# Patient Record
Sex: FEMALE | Race: ASIAN | Employment: UNEMPLOYED | URBAN - METROPOLITAN AREA
[De-identification: names, ages, dates, MRNs, and addresses within clinical notes are randomized per-mention and may not be internally consistent; named-entity substitution may affect disease eponyms.]

---

## 2021-12-06 ENCOUNTER — HOSPITAL ENCOUNTER (OUTPATIENT)
Age: 66
Setting detail: OUTPATIENT SURGERY
Discharge: HOME | End: 2021-12-06
Attending: INTERNAL MEDICINE | Admitting: INTERNAL MEDICINE
Payer: MEDICARE

## 2021-12-06 VITALS
RESPIRATION RATE: 16 BRPM | TEMPERATURE: 97.1 F | SYSTOLIC BLOOD PRESSURE: 113 MMHG | HEART RATE: 60 BPM | BODY MASS INDEX: 28.61 KG/M2 | DIASTOLIC BLOOD PRESSURE: 66 MMHG | OXYGEN SATURATION: 100 % | HEIGHT: 60 IN | WEIGHT: 145.7 LBS

## 2021-12-06 PROCEDURE — 99153 MOD SED SAME PHYS/QHP EA: CPT | Performed by: INTERNAL MEDICINE

## 2021-12-06 PROCEDURE — 77030039961 HC KT CUST COLON BSC -D: Performed by: INTERNAL MEDICINE

## 2021-12-06 PROCEDURE — 77030013991 HC SNR POLYP ENDOSC BSC -A: Performed by: INTERNAL MEDICINE

## 2021-12-06 PROCEDURE — 76040000008: Performed by: INTERNAL MEDICINE

## 2021-12-06 PROCEDURE — 2709999900 HC NON-CHARGEABLE SUPPLY: Performed by: INTERNAL MEDICINE

## 2021-12-06 PROCEDURE — 74011250636 HC RX REV CODE- 250/636: Performed by: INTERNAL MEDICINE

## 2021-12-06 PROCEDURE — 77030003657 HC NDL SCLER BSC -B: Performed by: INTERNAL MEDICINE

## 2021-12-06 PROCEDURE — G0500 MOD SEDAT ENDO SERVICE >5YRS: HCPCS | Performed by: INTERNAL MEDICINE

## 2021-12-06 PROCEDURE — 77030040361 HC SLV COMPR DVT MDII -B: Performed by: INTERNAL MEDICINE

## 2021-12-06 PROCEDURE — 88305 TISSUE EXAM BY PATHOLOGIST: CPT

## 2021-12-06 RX ORDER — LANOLIN ALCOHOL/MO/W.PET/CERES
500 CREAM (GRAM) TOPICAL DAILY
COMMUNITY

## 2021-12-06 RX ORDER — CHOLECALCIFEROL (VITAMIN D3) 50 MCG
1 CAPSULE ORAL DAILY
COMMUNITY

## 2021-12-06 RX ORDER — FLUMAZENIL 0.1 MG/ML
0.2 INJECTION INTRAVENOUS
Status: DISCONTINUED | OUTPATIENT
Start: 2021-12-06 | End: 2021-12-06 | Stop reason: HOSPADM

## 2021-12-06 RX ORDER — GLUCOSAMINE SULFATE 1500 MG
1000 POWDER IN PACKET (EA) ORAL DAILY
COMMUNITY

## 2021-12-06 RX ORDER — DEXTROMETHORPHAN/PSEUDOEPHED 2.5-7.5/.8
1.2 DROPS ORAL
Status: CANCELLED | OUTPATIENT
Start: 2021-12-06

## 2021-12-06 RX ORDER — MENTHOL
1000 GEL (GRAM) TOPICAL DAILY
COMMUNITY

## 2021-12-06 RX ORDER — SODIUM CHLORIDE 0.9 % (FLUSH) 0.9 %
5-40 SYRINGE (ML) INJECTION AS NEEDED
Status: CANCELLED | OUTPATIENT
Start: 2021-12-06

## 2021-12-06 RX ORDER — SODIUM CHLORIDE 9 MG/ML
1000 INJECTION, SOLUTION INTRAVENOUS CONTINUOUS
Status: DISCONTINUED | OUTPATIENT
Start: 2021-12-06 | End: 2021-12-06 | Stop reason: HOSPADM

## 2021-12-06 RX ORDER — FENTANYL CITRATE 50 UG/ML
100 INJECTION, SOLUTION INTRAMUSCULAR; INTRAVENOUS
Status: DISCONTINUED | OUTPATIENT
Start: 2021-12-06 | End: 2021-12-06 | Stop reason: HOSPADM

## 2021-12-06 RX ORDER — NALOXONE HYDROCHLORIDE 0.4 MG/ML
0.4 INJECTION, SOLUTION INTRAMUSCULAR; INTRAVENOUS; SUBCUTANEOUS
Status: DISCONTINUED | OUTPATIENT
Start: 2021-12-06 | End: 2021-12-06 | Stop reason: HOSPADM

## 2021-12-06 RX ORDER — ATROPINE SULFATE 0.1 MG/ML
0.5 INJECTION INTRAVENOUS
Status: CANCELLED | OUTPATIENT
Start: 2021-12-06 | End: 2021-12-07

## 2021-12-06 RX ORDER — DIPHENHYDRAMINE HYDROCHLORIDE 50 MG/ML
50 INJECTION, SOLUTION INTRAMUSCULAR; INTRAVENOUS ONCE
Status: CANCELLED | OUTPATIENT
Start: 2021-12-06 | End: 2021-12-06

## 2021-12-06 RX ORDER — MIDAZOLAM HYDROCHLORIDE 1 MG/ML
.25-5 INJECTION, SOLUTION INTRAMUSCULAR; INTRAVENOUS
Status: DISCONTINUED | OUTPATIENT
Start: 2021-12-06 | End: 2021-12-06 | Stop reason: HOSPADM

## 2021-12-06 RX ORDER — PYRIDOXINE HCL (VITAMIN B6) 100 MG
100 TABLET ORAL DAILY
COMMUNITY

## 2021-12-06 RX ORDER — EPINEPHRINE 0.1 MG/ML
1 INJECTION INTRACARDIAC; INTRAVENOUS
Status: CANCELLED | OUTPATIENT
Start: 2021-12-06 | End: 2021-12-07

## 2021-12-06 RX ORDER — SODIUM CHLORIDE 0.9 % (FLUSH) 0.9 %
5-40 SYRINGE (ML) INJECTION EVERY 8 HOURS
Status: CANCELLED | OUTPATIENT
Start: 2021-12-06

## 2021-12-06 RX ADMIN — SODIUM CHLORIDE 1000 ML: 9 INJECTION, SOLUTION INTRAVENOUS at 12:36

## 2021-12-06 NOTE — DISCHARGE INSTRUCTIONS
[facility]  [id number]  1955    COLON DISCHARGE INSTRUCTIONS    Discomfort:  Redness at IV site- apply warm compress to area; if redness or soreness persist- contact your physician  There may be a slight amount of blood passed from the rectum  Gaseous discomfort- walking, belching will help relieve any discomfort  You may not operate a vehicle til the next day. You may not engage in an occupation involving machinery or appliances til the next day. You may not drink alcoholic beverages til the next day. DIET:   High fiber diet. ACTIVITY:  You may not  resume your normal daily activities til the next day. it is recommended that you spend the remainder of the day resting -  avoid any strenuous activity. CALL M.D.  IF ANY SIGN OF:   Increasing pain, nausea, vomiting  Abdominal distension (swelling)  New increased bleeding (oral or rectal)  Fever (chills)  Pain in chest area  Bloody discharge from nose or mouth  Shortness of breath    You may not  take any Advil, Aspirin, Ibuprofen, Motrin, Aleve, or Goodys for 14 days, ONLY  Tylenol as needed for pain. Post procedure diagnosis:  ANASTAMOSIS AT 16 CM; POLYPS; DIVERTICULOSIS;     Follow-up Instructions: Your follow up colonoscopy will be in 3 years. We will notify you the results of your biopsy by letter within 2 weeks.     Surinder Hinkle MD  December 6, 2021       DISCHARGE SUMMARY from Nurse    The following personal items collected during your admission are returned to you:   Dental Appliance: Dental Appliances: None  Vision: Visual Aid: Glasses, At bedside, With patient  Hearing Aid:    Jewelry:    Clothing:    Other Valuables:    Valuables sent to safe:              PATIENT INSTRUCTIONS:    After general anesthesia or intravenous sedation, for 24 hours or while taking prescription Narcotics:  · Limit your activities  · Do not drive and operate hazardous machinery  · Do not make important personal or business decisions  · Do  not drink alcoholic beverages  · If you have not urinated within 8 hours after discharge, please contact your surgeon on call. Report the following to your surgeon:  · Excessive pain, swelling, redness or odor of or around the surgical area  · Temperature over 100.5  · Nausea and vomiting lasting longer than 4 hours or if unable to take medications  · Any signs of decreased circulation or nerve impairment to extremity: change in color, persistent  numbness, tingling, coldness or increase pain  · Any questions      No orders of the defined types were placed in this encounter. What to do at Home:  Recommended activity: as above,     If you experience any of the following symptoms as above, please follow up with Dr. Peter Loera. *  Please give a list of your current medications to your Primary Care Provider. *  Please update this list whenever your medications are discontinued, doses are      changed, or new medications (including over-the-counter products) are added. *  Please carry medication information at all times in case of emergency situations. These are general instructions for a healthy lifestyle:    No smoking/ No tobacco products/ Avoid exposure to second hand smoke    Surgeon General's Warning:  Quitting smoking now greatly reduces serious risk to your health. Obesity, smoking, and sedentary lifestyle greatly increases your risk for illness    A healthy diet, regular physical exercise & weight monitoring are important for maintaining a healthy lifestyle    You may be retaining fluid if you have a history of heart failure or if you experience any of the following symptoms:  Weight gain of 3 pounds or more overnight or 5 pounds in a week, increased swelling in our hands or feet or shortness of breath while lying flat in bed. Please call your doctor as soon as you notice any of these symptoms; do not wait until your next office visit.     Recognize signs and symptoms of STROKE:    F-face looks uneven    A-arms unable to move or move unevenly    S-speech slurred or non-existent    T-time-call 911 as soon as signs and symptoms begin-DO NOT go       Back to bed or wait to see if you get better-TIME IS BRAIN. The discharge information has been reviewed with the patient and daughter. The patient and daughter verbalized understanding. Warning Signs of HEART ATTACK     Call 911 if you have these symptoms:   Chest discomfort. Most heart attacks involve discomfort in the center of the chest that lasts more than a few minutes, or that goes away and comes back. It can feel like uncomfortable pressure, squeezing, fullness, or pain.  Discomfort in other areas of the upper body. Symptoms can include pain or discomfort in one or both arms, the back, neck, jaw, or stomach.  Shortness of breath with or without chest discomfort.  Other signs may include breaking out in a cold sweat, nausea, or lightheadedness. Don't wait more than five minutes to call 911 - MINUTES MATTER! Fast action can save your life. Calling 911 is almost always the fastest way to get lifesaving treatment. Emergency Medical Services staff can begin treatment when they arrive -- up to an hour sooner than if someone gets to the hospital by car. The discharge information has been reviewed with the patient and caregiver. The patient and caregiver verbalized understanding. Discharge medications reviewed with the patient and guardian and appropriate educational materials and side effects teaching were provided.     Patient armband removed and shredded

## 2021-12-06 NOTE — PROCEDURES
[facility]  Colonoscopy Procedure Report  _______________________________________________________  Patient: Emilie Jaquez                                        Attending Physician: Juan Fraga MD    Patient ID: [id number]                                    Referring Physician: Jocy Russell, Not On File, FNP-C    Exam Date: 12/6/2021      Introduction: A  77 y.o. female patient, presents for inpatient Colonoscopy    Indications: Pt of Dr. Lorena Finnegan, here overdue for 3yr Recall, for surveillance of colonic adenocarcinoma (2014) s/p (LAR) Low-Anterior Resection (2015) and Chemotherapy treatment. Last negative exam in 2016 (1x Hyperplastic polyp addressed), 3yr Recall was not kept. Diagnosed with colon cancer at 63yo, on first ever colonoscopy, w/presenting symptoms of several months of intermittent rectal bleeding. Hx of ulcerating, obstructive sigmoid colon cancer at 25cm level (Tumor spread from 17cm to 30cm), revealed on 12/15/2014 [facility], and confirmed to be invasive well-differentiated colonic Adenocarcinoma. Tx: s/p (LAR) Low-Anterior Resection on 1/12/2015 by Dr. Bennie Maradiaga @[facility]. Pathology ([id number]) showed invasive Adenocarcinoma in the rectosigmoid colon, low grade, T3N1 with 3/21 lymph nodes. There was perineural invasion, but no lymphovascular invasion. Margins were clear. Treated with Adjuvant chemo FOLFOX: 2/11/2015 to 7/24/2015. Followed by Dr. Yuliya Snow (Heme/Oncologist) @[facility]. Last colonoscopy (2nd exam) was done on 1/28/2016 by Dr. Chaya Klein @[facility]: s/p LAR with \"clean\" anastomosis, 1x sessile polyp removed from mid-transverse colon, cold forceps (Hyperplastic), 3yr Recall (Not Kept). Average risk, no FHx of colon cancer. Asymptomatic of GI complaints. BMI: 28.6 (Short Frame), BM: 1-3/day. Consent: The benefits, risks, and alternatives to the procedure were discussed and informed consent was obtained from the patient.     Preparation: EKG, pulse, pulse oximetry and blood pressure were monitored throughout the procedure. ASA Classification: Class I- . The heart is an S1-S2 and regular heart rate and rhythm. Lungs are clear to auscultation and percussion. Abdomen is soft, nondistended, and nontender. Mental Status: awake, alert, and oriented to person, place, and time    Medications:  · Fentanyl 100 mcg IV before procedure. · Versed 4 mg IV, Glucagon 1 mg IV throughout the procedure. Rectal Exam: Normal Rectal Exam. No Blood. Pathology Specimens:  2    Procedure: The colonoscope was passed with mild difficulty through the anus under direct visualization and advanced to the cecum and 5 cm inside the terminal ileum. Retroflexion is made in the ascending colon. The scope was withdrawn and the mucosa was carefully examined. The quality of the preparation was very good. The views were excellent. The patient's toleration of the procedure was excellent. The exam was done twice to the cecum. Total time is 32 minutes and withdrawal time is 25 minutes. Findings:    Rectum:   Small internal hemorrhoids. 6.5 mm sessile polyp in the mid posterior rectum, hot snared. Sigmoid:   Slightly tortuous sigmoid colon with moderate sigmoid diverticulosis. End to end colo-colonic anastomosis located at 16 cm. Descending Colon:   Mild descending colon diverticulosis. Transverse Colon:   Mild descending and transverse colon diverticulosis. 6.5 mm sessile polyp in the proximal transverse colon. Ascending Colon: Moderate ascending colon diverticulosis. 2 sessile polyps in the ascending colon. The small one 5 mm hot snared. The second is much larger 13 x 8 mm, hot snared after saline injection. Cecum:   8 mm very flat smooth polyp in the cecum, hot snared after saline injection and added to the ascending colon bottle. Terminal Ileum:   Normal.      Unplanned Events: There were no unplanned events.     Estimated Blood Loss: None  IMPLANTS: * No surgical log found *  Impressions: Small internal hemorrhoids. 6.5 mm sessile polyp in the mid posterior rectum, hot snared. Slightly tortuous sigmoid with moderate sigmoid diverticulosis. End to end colo-colonic anastomosis located at 16 cm. Mild descending and transverse colon diverticulosis. 6.5 mm sessile polyp in the proximal transverse colon. moderate ascending colon diverticulosis. 2 sessile polyps in the ascending colon. The small one 5 mm hot snared. The second is much larger 13 x 8 mm, hot snared after saline injection. 8 mm very flat smooth polyp in the cecum, hot snared after saline injection and added to the ascending colon bottle. Normal Mucosa. No blood or AVM found. Complications: None; patient tolerated the procedure well. Recommendations:  · Discharge home when standard parameters are met. · Resume a high fiber diet. · Resume own medications. Avoid all NSAID's for 14 days  · Colonoscopy recommendation in 3 years.   · Take Miralax and/ or Colace 100 mg on regular basis if constipated    Procedure Codes:    Santa Hickman [[id number]]  Zaria Velasquez [address] [facility] [[id number]]    Endoscope Information:  Model Number(s)    [id number]   Assistant: None  Signed By: Jf Santoro MD Date: 12/6/2021

## 2021-12-06 NOTE — H&P
Assessment/Plan  # Detail Type Description    1. Assessment Personal hx of other colon cancer (Z85.038). Impression Pt of Dr. Linnette Pike, here overdue for 3yr Recall, for surveillance of colonic adenocarcinoma (2014) s/p (LAR) Low-Anterior Resection (2015) and Chemotherapy treatment. Last negative exam in 2016 (1x Hyperplastic polyp addressed), 3yr Recall was not kept.  _______________________________  Diagnosed with colon cancer at 65yo, on first ever colonoscopy, w/presenting symptoms of several months of intermittent rectal bleeding. Hx of ulcerating, obstructive sigmoid colon cancer at 25cm level (Tumor spread from 17cm to 30cm), revealed on 12/15/2014 exam [facility], and confirmed to be invasive well-differentiated colonic Adenocarcinoma. Tx: s/p (LAR) Low-Anterior Resection on 1/12/2015 by Dr. Ac Calix @[facility]. Pathology ([id number]) showed invasive Adenocarcinoma in the rectosigmoid colon, low grade, T3N1 with 3/21 lymph nodes. There was perineural invasion, but no lymphovascular invasion. Margins were clear. Treated with Adjuvant chemo FOLFOX: 2/11/2015 to 7/24/2015. Followed by Dr. Eliana Blanton (Heme/Oncologist) @[facility]. *Last colonoscopy (2nd exam) was done on 1/28/2016 by Dr. Shasta Elizondo @[facility]: s/p LAR with \"clean\" anastomosis, 1x sessile polyp removed from mid-transverse colon, cold forceps (Hyperplastic), 3yr Recall (Not Kept). _______________________________  Average risk, no FHx of colon cancer. Asymptomatic of GI complaints. BMI: 28.6 (Short Frame), BM: 1-3/day. Patient Plan *C-scope Plan:  Colonoscopy ordered with Dr. Christi Ashton (PEDI scope) with Miralax bowel prep, and Mag Citrate 2 days before prep, and Miralax and stool softeners starting 3 days before prep. *C-scope Risks:  Stressed importance of following all bowel preparation instructions. Explained the procedure to the patient including all risks and benefits.   These risks consist of missed lesions on exam, bleeding, and bowel perforation with possible need for admission to the hospital, and in the most extensive of  circumstances, the patient may require surgery. Pt verbalized understanding of these risks and is agreeable with this procedure    Plan Orders Further diagnostic evaluations ordered today include(s) DIAGNOSTIC COLONOSCOPY to be performed. This 72year old  patient was referred by Jm Garg. This 72year old female presents for Hx polyp/colon cancer. History of Present Illness  1. Hx polyp/colon cancer   Prior screening:  colonoscopy. Risk Factors: personal history of colon cancer and Adenocarcinoma. Pertinent negatives include abdominal pain, change in bowel habits, change in stool caliber, constipation, decreased appetite, diarrhea, melena, nausea, rectal bleeding, vomiting, weight gain and weight loss. Additional information: No family history of colon cancer and BM: 1-3x daily. Diagnosed with colon cancer at 63yo, on first ever colonoscopy, w/presenting symptoms of several months of intermittent rectal bleeding. Hx of ulcerating, obstructive sigmoid colon cancer at 25cm level (Tumor spread from 17cm to 30cm), revealed on 12/15/2014 exam @[facility], and confirmed to be invasive well-differentiated colonic Adenocarcinoma. Tx: s/p (LAR) Low-Anterior Resection on 1/12/2015 by Dr. Maria Alejandra Mares @[facility]. Pathology ([id number]) showed invasive Adenocarcinoma in the rectosigmoid colon, low grade, T3N1 with 3/21 lymph nodes. There was perineural invasion, but no lymphovascular invasion. Margins were clear. Treated with Adjuvant chemo FOLFOX: 2/11/2015 to 7/24/2015. Followed by Dr. Ruslan Aponte (Heme/Oncologist) @[facility]. *Last colonoscopy (2nd exam) was done on 1/28/2016 by Dr. Venita Rodirguez @[facility]: s/p LAR with \"clean\" anastomosis, 1x sessile polyp removed from mid-transverse colon, cold forceps (Hyperplastic), 3yr Recall (Not Kept).     Past Medical/Surgical History (Detailed)  Disease/disorder Onset Date Management Date Comments   Colon Cancer @58yo: Ulcerative & Obstructing Sigmoid Colon Adenocarcinoma 12/15/2014 Partial Colectomy (LAR); Chemotherapy - Dr. Diane Rojas @[facility] 2015    Colonic Adenomas:  Negative Exams: 2016 (1x HP),           Family History   (Detailed)    Relationship Family Member Name  Age at Death Condition Onset Age Cause of Death       No family history of (CRC) colorectal cancer. N   Father    Cancer, lung  N     Social History  (Detailed)  Tobacco use reviewed. Preferred language is Quan. Marital Status/Family/Social Support  Marital status:      Tobacco use status: Current non-smoker. Smoking status: Never smoker. Tobacco Screening  Patient has never used tobacco. Patient has not used tobacco in the last 30 days. Patient has not used smokeless tobacco in the last 30 days. Smoking Status  Type Smoking Status Usage Per Day Years Used Pack Years Total Pack Years    Never smoker         Alcohol  There is no history of alcohol use. Caffeine  The patient uses caffeine: coffee - 1 cup a day. Sikhism/Spiritual  The patient has [facility]. Patient agrees to transfusion. Medications (active prior to today)  Medication Instructions Start Date Stop Date Refilled Elsewhere   Centrum Silver 0.4 mg-300 mcg-250 mcg tablet  //   Y     Patient Status   Completed with information received for patient in a summary of care record. Medication Reconciliation  Medications reconciled today. Medications (Added, Continued or Stopped today)  Start Date Medication Directions PRN Status PRN Reason Instruction Stop Date    Centrum Silver 0.4 mg-300 mcg-250 mcg tablet  N        Allergies  Ingredient Reaction (Severity) Medication Name Comment   NO KNOWN ALLERGIES        Reviewed, no changes. Orders  Status Lab Order Time Frame Comments   ordered Referrals: Gastroenterology.  Jay Castellon MD. Consult  Hx of adenocarcinoma of colon s/p partial resection. Last scope was in 2016 and she was recommended to do 3 year follow up and is now overdue. 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 ordered Cholesterol, Total  AU     ordered HDL Cholesterol  AU     ordered Triglycerides  AU     ordered Glucose, Serum AU     result received HCV Antibody     result received Comp. Metabolic Panel (14)     result received Hemoglobin A1c     result received Lipid Panel With LDL/HDL Ratio     result received TSH     result received CBC With Differential/Platelet     ordered DIAGNOSTIC COLONOSCOPY         Review of Systems  System Neg/Pos Details   Constitutional Negative Fever, Weight gain and Weight loss. ENMT Negative Sinus Infection. Eyes Negative Double vision. Respiratory Negative Asthma, Chronic cough and Dyspnea. Cardio Negative Chest pain, Edema and Irregular heartbeat/palpitations. GI Negative Abdominal pain, Change in bowel habits, Change in stool caliber, Constipation, Decreased appetite, Diarrhea, Dysphagia, Heartburn, Hematemesis, Hematochezia, Melena, Nausea, Rectal bleeding, Reflux and Vomiting.  Negative Dysuria and Hematuria. Endocrine Negative Cold intolerance and Heat intolerance. Neuro Negative Dizziness, Headache, Numbness and Tremors. Psych Negative Anxiety, Depression and Increased stress. Integumentary Negative Hives, Pruritus and Rash. MS Negative Back pain, Joint pain and Myalgia. Hema/Lymph Negative Easy bleeding, Easy bruising and Lymphadenopathy. Allergic/Immuno Negative Food allergies and Immunosuppression.        Vital Signs   Height  Time ft in cm Last Measured Height Position   1:15 PM 5.0 0.50 153.67 09/14/2021 Standing     Date/Time Temp Pulse BP Arterial Line 1 BP (mmHg) BP Patient Position Resp SpO2 O2 Device O2 Flow Rate (L/min) Level of Consciousness MEWS Score Weight   12/06/21 1220 98.2 °F (36.8 °C) 70 140/73 Abnormal  -- -- 16 97 % None (Room air) -- Alert (0) 1 66.1 kg (145 lb 11.2 oz)       Physical  Exam  Exam Findings Details   Female GI Quick Visit Comments Short Frame. Constitutional Normal Well developed. Eyes Normal Conjunctiva - Right: Normal, Left: Normal. Sclera - Right: Normal, Left: Normal.   Nasopharynx Normal Lips/teeth/gums - Normal.   Neck Exam Normal Inspection - Normal.   Respiratory Normal Inspection - Normal.   Cardiovascular Normal Regular rate and rhythm. No murmurs, gallops, or rubs. Vascular Normal Pulses - Brachial: Normal.   Skin Normal Inspection - Normal.   Musculoskeletal Normal Hands/Wrist - Right: Normal, Left: Normal.   Extremity Normal No edema. Neurological Normal Fine motor skills - Normal.   Psychiatric Normal Orientation - Oriented to time, place, person & situation. Appropriate mood and affect.    Immunizations Entered by History  Date Immunization   6/18/2021 12:00:00 AM SARS-COV-2 (COVID-19) vaccine, mRNA, spike protein, LNP, preservative free, 30 mcg/0.3mL dose   5/21/2021 12:00:00 AM SARS-COV-2 (COVID-19) vaccine, mRNA, spike protein, LNP, preservative free, 30 mcg/0.3mL dose   10/5/2018 12:00:00 AM tetanus toxoid, reduced diphtheria toxoid, and acellular pertussis vaccine, adsorbed       Active Patient Care Team Members  Name Contact Agency Type Support Role Relationship Active Date Inactive Date Specialty   [facility]   Patient provider PCP   [facility]   Mariusz Hu   Emergency Contact Child, Mother is the Patient      [facility]   encounter provider    Gastroenterology       No change in H&P

## 2021-12-07 NOTE — PERIOP NOTES
During the procedure 12/6/2021, patient received Fentanyl 100 mcg and Versed 4 mg, Versed 1 mg was wasted by Avril Stephens and witnessed by me [facility] - ERICA PUENTE, RN, [facility], [facility]).

## 2021-12-07 NOTE — PROGRESS NOTES
Patient received 4 mg/mL of midazolam(versed) and 100 mg Fentanyl citrate (PF) during the procedure. Waste documented and witnessed by Robyn Lo of 1 mg/mL midazolam(versed) today.

## 2025-03-11 ENCOUNTER — APPOINTMENT (OUTPATIENT)
Facility: HOSPITAL | Age: 70
End: 2025-03-11
Payer: MEDICARE

## 2025-03-11 ENCOUNTER — HOSPITAL ENCOUNTER (EMERGENCY)
Facility: HOSPITAL | Age: 70
Discharge: HOME | End: 2025-03-11
Payer: MEDICARE

## 2025-03-11 VITALS
RESPIRATION RATE: 16 BRPM | SYSTOLIC BLOOD PRESSURE: 139 MMHG | WEIGHT: 154 LBS | HEART RATE: 86 BPM | TEMPERATURE: 97.5 F | DIASTOLIC BLOOD PRESSURE: 54 MMHG | OXYGEN SATURATION: 96 % | BODY MASS INDEX: 29.84 KG/M2

## 2025-03-11 DIAGNOSIS — M79.645 PAIN IN FINGER OF LEFT HAND: Primary | ICD-10-CM

## 2025-03-11 PROCEDURE — 73140 X-RAY EXAM OF FINGER(S): CPT

## 2025-03-11 PROCEDURE — 99283 EMERGENCY DEPT VISIT LOW MDM: CPT

## 2025-03-11 RX ORDER — CELECOXIB 100 MG/1
100 CAPSULE ORAL DAILY
Qty: 15 CAPSULE | Refills: 0 | Status: SHIPPED | OUTPATIENT
Start: 2025-03-11 | End: 2025-03-26

## 2025-03-11 ASSESSMENT — PAIN - FUNCTIONAL ASSESSMENT: PAIN_FUNCTIONAL_ASSESSMENT: 0-10

## 2025-03-11 ASSESSMENT — PAIN SCALES - GENERAL: PAINLEVEL_OUTOF10: 8

## 2025-03-11 NOTE — ED TRIAGE NOTES
Pt ambulated to triage. C/C pointer finger pain on her left hand x3 days. Pt reports she is unable to bend it. Denies any injury to the digit. Denies anything like this ever happening before.

## 2025-03-11 NOTE — ED PROVIDER NOTES
MEDICATIONS:     Medication List        START taking these medications      celecoxib 100 MG capsule  Commonly known as: CeleBREX  Take 1 capsule by mouth daily for 15 days            ASK your doctor about these medications      cyanocobalamin 500 MCG tablet     pyridoxine 100 MG tablet  Commonly known as: B-6     vitamin D 25 MCG (1000 UT) Caps     vitamin E 1000 units capsule               Where to Get Your Medications        These medications were sent to [facility] - [place], [place] - [address] - P [phone] - F [phone]  [address] [address]      Phone: [phone]   celecoxib 100 MG capsule                  I am the Primary Clinician of Record.       (Please note that parts of this dictation were completed with voice recognition software. Quite often unanticipated grammatical, syntax, homophones, and other interpretive errors are inadvertently transcribed by the computer software. Please disregards these errors. Please excuse any errors that have escaped final proofreading.)       Anuradha Guardado PA-C  03/11/25 [phone]

## 2025-03-31 NOTE — H&P
Obstructing Sigmoid Colon Adenocarcinoma 12/15/2014 Partial Colectomy (LAR); Chemotherapy - Dr. Paul Roy @[facility] 2015    Colonic Adenomas:  Negative Exams: 2016 (1x HP),           Family History   (Detailed)    Relationship Family Member Name  Age at Death Condition Onset Age Cause of Death       No family history of (CRC) colorectal cancer.  N   Father    Cancer, lung  N     Social History  (Detailed)  Tobacco use reviewed.    Preferred language is Faroese.      Marital Status/Family/Social Support  Marital status:      Tobacco use status: Current non-smoker.    Smoking status: Never smoker.    Tobacco Screening  Patient has never used tobacco. Patient has not used tobacco in the last 30 days. Patient has not used smokeless tobacco in the last 30 days.    Smoking Status  Type Smoking Status Usage Per Day Years Used Pack Years Total Pack Years    Never smoker         Alcohol  There is no history of alcohol use.     Caffeine  The patient uses caffeine: coffee - 1 cup a day.    Zoroastrianism/Spiritual  The patient has Muslim Gnosticist affiliation.    Patient agrees to transfusion.       Medications (active prior to today)  Medication Instructions Start Date Stop Date Refilled Elsewhere   Centrum Silver 0.4 mg-300 mcg-250 mcg tablet  //   Y     Patient Status   Completed with information received for patient in a summary of care record.     Medication Reconciliation  Medications reconciled today.      Medications (Added, Continued or Stopped today)  Start Date Medication Directions PRN Status PRN Reason Instruction Stop Date    Centrum Silver 0.4 mg-300 mcg-250 mcg tablet  N        Allergies  Ingredient Reaction (Severity) Medication Name Comment   NO KNOWN ALLERGIES        Reviewed, no changes.      Orders  Status Lab Order Time Frame Comments   ordered Referrals: Gastroenterology. Andres Mclaughlin MD. Consult  Hx of adenocarcinoma of colon s/p partial resection.  Last scope was in  and she was  Exam  Exam Findings Details   Female GI Quick Visit Comments Short Frame.   Constitutional Normal Well developed.   Eyes Normal Conjunctiva - Right: Normal, Left: Normal. Sclera - Right: Normal, Left: Normal.   Nasopharynx Normal Lips/teeth/gums - Normal.   Neck Exam Normal Inspection - Normal.   Respiratory Normal Inspection - Normal.   Cardiovascular Normal Regular rate and rhythm. No murmurs, gallops, or rubs.   Vascular Normal Pulses - Brachial: Normal.   Skin Normal Inspection - Normal.   Musculoskeletal Normal Hands/Wrist - Right: Normal, Left: Normal.   Extremity Normal No edema.   Neurological Normal Fine motor skills - Normal.   Psychiatric Normal Orientation - Oriented to time, place, person & situation. Appropriate mood and affect.   Immunizations Entered by History  Date Immunization   6/18/2021 12:00:00 AM SARS-COV-2 (COVID-19) vaccine, mRNA, spike protein, LNP, preservative free, 30 mcg/0.3mL dose   5/21/2021 12:00:00 AM SARS-COV-2 (COVID-19) vaccine, mRNA, spike protein, LNP, preservative free, 30 mcg/0.3mL dose   10/5/2018 12:00:00 AM tetanus toxoid, reduced diphtheria toxoid, and acellular pertussis vaccine, adsorbed       Active Patient Care Team Members  Name Contact Agency Type Support Role Relationship Active Date Inactive Date Specialty   Willow Mayberry   Patient provider PCP   Family Practice   Zay Jamison   Emergency Contact Child, Mother is the Patient      Tere Johansen   encounter provider    Gastroenterology     Patient is questioned and examined

## 2025-04-01 ENCOUNTER — HOSPITAL ENCOUNTER (OUTPATIENT)
Facility: HOSPITAL | Age: 70
Setting detail: OUTPATIENT SURGERY
Discharge: HOME | End: 2025-04-01
Attending: INTERNAL MEDICINE | Admitting: INTERNAL MEDICINE
Payer: MEDICARE

## 2025-04-01 VITALS
DIASTOLIC BLOOD PRESSURE: 69 MMHG | TEMPERATURE: 97.7 F | HEART RATE: 78 BPM | HEIGHT: 60 IN | WEIGHT: 140.5 LBS | SYSTOLIC BLOOD PRESSURE: 120 MMHG | BODY MASS INDEX: 27.58 KG/M2 | RESPIRATION RATE: 16 BRPM | OXYGEN SATURATION: 95 %

## 2025-04-01 LAB — GLUCOSE BLD STRIP.AUTO-MCNC: 117 MG/DL (ref 70–110)

## 2025-04-01 PROCEDURE — 3600007502: Performed by: INTERNAL MEDICINE

## 2025-04-01 PROCEDURE — 3600007512: Performed by: INTERNAL MEDICINE

## 2025-04-01 PROCEDURE — 7100000010 HC PHASE II RECOVERY - FIRST 15 MIN: Performed by: INTERNAL MEDICINE

## 2025-04-01 PROCEDURE — 99153 MOD SED SAME PHYS/QHP EA: CPT | Performed by: INTERNAL MEDICINE

## 2025-04-01 PROCEDURE — 2580000003 HC RX 258: Performed by: INTERNAL MEDICINE

## 2025-04-01 PROCEDURE — 88305 TISSUE EXAM BY PATHOLOGIST: CPT

## 2025-04-01 PROCEDURE — 7100000011 HC PHASE II RECOVERY - ADDTL 15 MIN: Performed by: INTERNAL MEDICINE

## 2025-04-01 PROCEDURE — 6360000002 HC RX W HCPCS: Performed by: INTERNAL MEDICINE

## 2025-04-01 PROCEDURE — 2709999900 HC NON-CHARGEABLE SUPPLY: Performed by: INTERNAL MEDICINE

## 2025-04-01 PROCEDURE — 82962 GLUCOSE BLOOD TEST: CPT

## 2025-04-01 PROCEDURE — 99152 MOD SED SAME PHYS/QHP 5/>YRS: CPT | Performed by: INTERNAL MEDICINE

## 2025-04-01 RX ORDER — FENTANYL CITRATE 50 UG/ML
100 INJECTION, SOLUTION INTRAMUSCULAR; INTRAVENOUS
Status: DISCONTINUED | OUTPATIENT
Start: 2025-04-01 | End: 2025-04-01 | Stop reason: HOSPADM

## 2025-04-01 RX ORDER — SIMETHICONE 40MG/0.6ML
40 SUSPENSION, DROPS(FINAL DOSAGE FORM)(ML) ORAL EVERY 6 HOURS PRN
Status: DISCONTINUED | OUTPATIENT
Start: 2025-04-01 | End: 2025-04-01 | Stop reason: HOSPADM

## 2025-04-01 RX ORDER — SODIUM CHLORIDE 9 MG/ML
INJECTION, SOLUTION INTRAVENOUS CONTINUOUS
Status: DISCONTINUED | OUTPATIENT
Start: 2025-04-01 | End: 2025-04-01 | Stop reason: HOSPADM

## 2025-04-01 RX ORDER — FLUMAZENIL 0.1 MG/ML
0.2 INJECTION INTRAVENOUS ONCE
Status: DISCONTINUED | OUTPATIENT
Start: 2025-04-01 | End: 2025-04-01 | Stop reason: HOSPADM

## 2025-04-01 RX ORDER — ATORVASTATIN CALCIUM 20 MG/1
TABLET, FILM COATED ORAL
COMMUNITY
Start: 2025-01-29

## 2025-04-01 RX ORDER — METFORMIN HYDROCHLORIDE 500 MG/1
TABLET, EXTENDED RELEASE ORAL
COMMUNITY
Start: 2025-01-29

## 2025-04-01 RX ORDER — FENTANYL CITRATE 50 UG/ML
INJECTION, SOLUTION INTRAMUSCULAR; INTRAVENOUS PRN
Status: DISCONTINUED | OUTPATIENT
Start: 2025-04-01 | End: 2025-04-01 | Stop reason: ALTCHOICE

## 2025-04-01 RX ORDER — GLYCOPYRROLATE 0.2 MG/ML
INJECTION INTRAMUSCULAR; INTRAVENOUS PRN
Status: DISCONTINUED | OUTPATIENT
Start: 2025-04-01 | End: 2025-04-01 | Stop reason: ALTCHOICE

## 2025-04-01 RX ORDER — NALOXONE HYDROCHLORIDE 0.4 MG/ML
0.4 INJECTION, SOLUTION INTRAMUSCULAR; INTRAVENOUS; SUBCUTANEOUS PRN
Status: DISCONTINUED | OUTPATIENT
Start: 2025-04-01 | End: 2025-04-01 | Stop reason: HOSPADM

## 2025-04-01 RX ORDER — DIPHENHYDRAMINE HYDROCHLORIDE 50 MG/ML
25 INJECTION, SOLUTION INTRAMUSCULAR; INTRAVENOUS EVERY 6 HOURS PRN
Status: DISCONTINUED | OUTPATIENT
Start: 2025-04-01 | End: 2025-04-01 | Stop reason: HOSPADM

## 2025-04-01 RX ORDER — EMPAGLIFLOZIN 25 MG/1
TABLET, FILM COATED ORAL
COMMUNITY
Start: 2025-01-29

## 2025-04-01 RX ORDER — MIDAZOLAM HYDROCHLORIDE 5 MG/5ML
5 INJECTION, SOLUTION INTRAMUSCULAR; INTRAVENOUS
Status: DISCONTINUED | OUTPATIENT
Start: 2025-04-01 | End: 2025-04-01 | Stop reason: HOSPADM

## 2025-04-01 RX ORDER — MIDAZOLAM HYDROCHLORIDE 1 MG/ML
INJECTION, SOLUTION INTRAMUSCULAR; INTRAVENOUS PRN
Status: DISCONTINUED | OUTPATIENT
Start: 2025-04-01 | End: 2025-04-01 | Stop reason: ALTCHOICE

## 2025-04-01 RX ORDER — GLYCOPYRROLATE 0.2 MG/ML
0.1 INJECTION INTRAMUSCULAR; INTRAVENOUS ONCE
Status: DISCONTINUED | OUTPATIENT
Start: 2025-04-01 | End: 2025-04-01 | Stop reason: HOSPADM

## 2025-04-01 RX ORDER — EPINEPHRINE IN SOD CHLOR,ISO 1 MG/10 ML
1 SYRINGE (ML) INTRAVENOUS ONCE
Status: DISCONTINUED | OUTPATIENT
Start: 2025-04-01 | End: 2025-04-01 | Stop reason: HOSPADM

## 2025-04-01 RX ADMIN — SODIUM CHLORIDE: 0.9 INJECTION, SOLUTION INTRAVENOUS at 10:48

## 2025-04-01 ASSESSMENT — PAIN - FUNCTIONAL ASSESSMENT
PAIN_FUNCTIONAL_ASSESSMENT: 0-10
PAIN_FUNCTIONAL_ASSESSMENT: NONE - DENIES PAIN
PAIN_FUNCTIONAL_ASSESSMENT: ADULT NONVERBAL PAIN SCALE (NPVS)
PAIN_FUNCTIONAL_ASSESSMENT: ADULT NONVERBAL PAIN SCALE (NPVS)
PAIN_FUNCTIONAL_ASSESSMENT: NONE - DENIES PAIN
PAIN_FUNCTIONAL_ASSESSMENT: ADULT NONVERBAL PAIN SCALE (NPVS)
PAIN_FUNCTIONAL_ASSESSMENT: ADULT NONVERBAL PAIN SCALE (NPVS)
PAIN_FUNCTIONAL_ASSESSMENT: NONE - DENIES PAIN

## 2025-04-01 NOTE — PRE SEDATION
Sedation Pre-Procedure Note    Patient Name: Venkatesh Jamison   YOB: 1955  Room/Bed: ENDO/PL  Medical Record Number: [id number]  Date: 4/1/2025   Time: 12:19 PM       Indication:  hx of colon cancer    Consent: I have discussed with the patient and/or the patient representative the indication, alternatives, and the possible risks and/or complications of the planned procedure and the anesthesia methods. The patient and/or patient representative appear to understand and agree to proceed.    Vital Signs:   Vitals:    04/01/25 1215   BP:    Pulse: 71   Resp:    Temp:    SpO2: 95%       Past Medical History:   has a past medical history of Arthritis, Cancer (HCC), and Hyperlipidemia.    Past Surgical History:   has a past surgical history that includes hx partial colectomy and Colonoscopy (N/A, 12/6/2021).    Medications:   Scheduled Meds:    flumazenil  0.2 mg IntraVENous Once    benzocaine   Mouth/Throat 4x Daily    EPINEPHrine  1 mg IntraVENous Once    glycopyrrolate  0.1 mg IntraVENous Once     Continuous Infusions:    sodium chloride 100 mL/hr at 04/01/25 1048    fentaNYL      midazolam       PRN Meds: naloxone, simethicone, diphenhydrAMINE  Home Meds:   Prior to Admission medications    Medication Sig Start Date End Date Taking? Authorizing Provider   metFORMIN (GLUCOPHAGE-XR) 500 MG extended release tablet  1/29/25  Yes Provider, MD Jomar   JARDIANCE 25 MG tablet  1/29/25  Yes Provider, Historical, MD   atorvastatin (LIPITOR) 20 MG tablet  1/29/25  Yes Provider, Historical, MD   Ascorbic Acid (VITAMIN C PO) Take by mouth   Yes Provider, Historical, MD   celecoxib (CELEBREX) 100 MG capsule Take 1 capsule by mouth daily for 15 days 3/11/25 3/26/25  Anuradha Guardado PA-C   cyanocobalamin 500 MCG tablet Take 1 tablet by mouth daily    Automatic Reconciliation, Ar   pyridoxine (B-6) 100 MG tablet Take 1 tablet by mouth daily    Automatic Reconciliation, Ar     Coumadin Use Last 7 Days:  no  Antiplatelet

## 2025-04-01 NOTE — PERIOP NOTE
Set patient up to get dressed, daughter at bedside, was charting at the desk and when turned around patient and daughter had left the unit.

## 2025-04-01 NOTE — PROCEDURES
[facility]  Colonoscopy Procedure Report  _______________________________________________________  Patient: Venkatesh Jamison                                        Attending Physician: ISABELLA Mclaughlin MD    Patient ID: [id number]                                    Referring Physician: Willow Edwards MD    Exam Date: 4/1/2025     Introduction: A  69 y.o. female patient, presents for inpatient Colonoscopy    Indications: Pt of Dr. Mayberry, here overdue for 3yr Recall, for surveillance of colonic adenocarcinoma (2014) s/p (LAR) Low-Anterior Resection (2015) and Chemotherapy treatment.  Last negative exam in 2016 (1x Hyperplastic polyp addressed), 3yr Recall was not kept.  Diagnosed with colon cancer at 60yo, on first ever colonoscopy, w/presenting symptoms of several months of intermittent rectal bleeding.  Hx of ulcerating, obstructive sigmoid colon cancer at 25cm level (Tumor spread from 17cm to 30cm), revealed on 12/15/2014 exam @[facility], and confirmed to be invasive well-differentiated colonic Adenocarcinoma.     Tx: s/p (LAR) Low-Anterior Resection on 1/12/2015 by Dr. Diaz @[facility]. Pathology ([id number]) showed invasive Adenocarcinoma in the rectosigmoid colon, low grade, T3N1 with 3/21 lymph nodes. There was perineural invasion, but no lymphovascular invasion. Margins were clear. Treated with Adjuvant chemo FOLFOX: 2/11/2015 to 7/24/2015. Followed by Dr. Paul Roy (Heme/Oncologist) @[facility].     Colonoscopy (2nd exam) was done on 1/28/2016 by Dr. Samuel Osborne @[facility]: s/p LAR with \"clean\" anastomosis, 1x sessile polyp removed from mid-transverse colon, cold forceps (Hyperplastic), 3yr Recall (Not Kept).  No FHx of colon cancer. Asymptomatic of GI complaints. BMI: 28.6 (Short Frame), BM: 1-3/day.  Last colonoscopy done by myself 12/6/ 2021: 6.5 mm sessile polyp in the mid posterior rectum, hot snared. Slightly tortuous sigmoid with moderate sigmoid diverticulosis. End to end colo-colonic    Mild to moderate diffuse diverticulosis.   Transverse Colon:   Tortious and spastic colon. Mild to moderate diffuse diverticulosis. 2 small flat 3 and 4 mm polyps in the proximal transverse colon, cold snared.  Ascending Colon:   Mild to moderate diffuse diverticulosis.   Cecum:   Normal  Terminal Ileum:   Normal.       Unplanned Events: There were no unplanned events.    Estimated Blood Loss: Negligible  IMPLANTS: * No implants in log *  Impressions: Small internal hemorrhoids. Tortious and spastic colon. Termino-terminal colo-colonic anastomosis at 15 cm. Mild to moderate diffuse colonic diverticulosis. 2 small flat 3 and 4 mm polyps in the proximal transverse colon, cold snared.. Normal Mucosa. No blood, polyps or AVM found.    Complications: None; patient tolerated the procedure well.    Recommendations:  Discharge home when standard parameters are met.  Resume a high fiber diet.  Resume own medications. Avoid all NSAID's for 3 days  Colonoscopy recommendation in 5 years.  Take Miralax and/ or Colace 100 mg on regular basis if constipated    Procedure Codes:    COLONOSCOPY,REMV LESN,SNARE [[id number]]     Endoscope Information:  Model Number(s)    [id number]   Assistant: None  Signed By: ISABELLA Mclaughlin MD Date: 4/1/2025

## 2025-04-01 NOTE — DISCHARGE INSTRUCTIONS
Venkatesh Jamison  [id number]  1955    COLON DISCHARGE INSTRUCTIONS    Discomfort:  Redness at IV site- apply warm compress to area; if redness or soreness persist- contact your physician  There may be a slight amount of blood passed from the rectum  Gaseous discomfort- walking, belching will help relieve any discomfort  You may not operate a vehicle til the next day.  You may not engage in an occupation involving machinery or appliances til the next day.  You may not drink alcoholic beverages til the next day.    DIET:   High fiber diet.     ACTIVITY:  You may not  resume your normal daily activities til the next day. it is recommended that you spend the remainder of the day resting -  avoid any strenuous activity.    CALL M.D.  IF ANY SIGN OF:   Increasing pain, nausea, vomiting  Abdominal distension (swelling)  New increased bleeding (oral or rectal)  Fever (chills)  Pain in chest area  Bloody discharge from nose or mouth  Shortness of breath    You may not take any Advil, Aspirin, Ibuprofen, Motrin, Aleve, or Goody’s for 3 days, ONLY  Tylenol as needed for pain.    Post procedure diagnosis:  Small internal hemorrhoids. Tortious and spastic colon. Termino-terminal colo-colonic anastomosis at 15 cm. Mild to moderate diffuse colonic diverticulosis. 2 small flat 3 and 4 mm polyps in the proximal transverse colon, cold snared.     Follow-up Instructions:   Your follow up colonoscopy will be in 5 years.    We will notify you the results of your biopsy by letter within 2 weeks.    Andres Mclaughlin MD  April 1, 2025        DISCHARGE SUMMARY from Nurse    PATIENT INSTRUCTIONS:    After general anesthesia or intravenous sedation, for 24 hours or while taking prescription Narcotics:  Limit your activities  Do not drive and operate hazardous machinery  Do not make important personal or business decisions  Do  not drink alcoholic beverages  If you have not urinated within 8 hours after discharge, please contact your  surgeon on call.    Report the following to your surgeon:  Excessive pain, swelling, redness or odor of or around the surgical area  Temperature over 100.5  Nausea and vomiting lasting longer than 4 hours or if unable to take medications  Any signs of decreased circulation or nerve impairment to extremity: change in color, persistent  numbness, tingling, coldness or increase pain  Any questions    What to do at Home:  Recommended activity: as above,     If you experience any of the following symptoms as above, please follow up with Dr. Mclaughlin.    *  Please give a list of your current medications to your Primary Care Provider.    *  Please update this list whenever your medications are discontinued, doses are      changed, or new medications (including over-the-counter products) are added.    *  Please carry medication information at all times in case of emergency situations.    These are general instructions for a healthy lifestyle:    No smoking/ No tobacco products/ Avoid exposure to second hand smoke  Surgeon General's Warning:  Quitting smoking now greatly reduces serious risk to your health.    Obesity, smoking, and sedentary lifestyle greatly increases your risk for illness    A healthy diet, regular physical exercise & weight monitoring are important for maintaining a healthy lifestyle    You may be retaining fluid if you have a history of heart failure or if you experience any of the following symptoms:  Weight gain of 3 pounds or more overnight or 5 pounds in a week, increased swelling in our hands or feet or shortness of breath while lying flat in bed.  Please call your doctor as soon as you notice any of these symptoms; do not wait until your next office visit.        The discharge information has been reviewed with the patient and daughter.  The patient and daughter verbalized understanding.  Discharge medications reviewed with the patient and daughter and appropriate educational materials and side

## (undated) DEVICE — NDL PRT INJ NSAF BLNT 18GX1.5 --

## (undated) DEVICE — CANNULA CUSH AD W/ 14FT TBG

## (undated) DEVICE — SYRINGE MED 5ML STD CLR PLAS LUERLOCK TIP N CTRL DISP

## (undated) DEVICE — Device: Brand: SINGLE USE ELECTROSURGICAL SNARE SD-400

## (undated) DEVICE — SNARE POLYP SM W13MMXL240CM SHTH DIA2.4MM OVL FLX DISP

## (undated) DEVICE — CATHETER IV 22GA L1IN BLU POLYUR STR HUB RADPQ PROTCT +

## (undated) DEVICE — TRNQT TEXT 1X18IN BLU LF DISP -- CONVERT TO ITEM 362165

## (undated) DEVICE — SPONGE GZ W4XL4IN RAYON POLY 4 PLY NONWOVEN FASTER WICKING

## (undated) DEVICE — NDL INJ SCLERO 25G 240CM -- INTERJECT M00518360 BX/5

## (undated) DEVICE — Device

## (undated) DEVICE — TOURNIQUET PHLEB W1XL18IN BLU FLAT RL AND BND REUSE FOR IV

## (undated) DEVICE — SOLUTION IV 1000 ML 0.9 NACL INJ USP EXCEL PLAS CONTAINER

## (undated) DEVICE — NDL FLTR TIP 5 MIC 18GX1.5IN --

## (undated) DEVICE — TRAP SPEC COLL POLYP POLYSTYR --

## (undated) DEVICE — SYRINGE MEDICAL 3ML CLEAR PLASTIC STANDARD NON CONTROL LUERLOCK TIP DISPOSABLE

## (undated) DEVICE — WRISTBAND ID AD W2.5XL9.5CM RED VYN ADH CLSR UNI-PRINT

## (undated) DEVICE — TUBING, SUCTION, 1/4" X 12', STRAIGHT: Brand: MEDLINE

## (undated) DEVICE — GARMENT,MEDLINE,DVT,INT,CALF,MED, GEN2: Brand: MEDLINE

## (undated) DEVICE — SPONGE GZ W4XL4IN COT 12 PLY TYP VII WVN C FLD DSGN

## (undated) DEVICE — SYR 5ML 1/5 GRAD LL NSAF LF --

## (undated) DEVICE — SOLUTION IV 500ML 0.9% SOD CHL FLX CONT

## (undated) DEVICE — SET ADMIN 16ML TBNG L100IN 2 Y INJ SITE IV PIGGY BK DISP

## (undated) DEVICE — KENDALL RADIOLUCENT FOAM MONITORING ELECTRODE RECTANGULAR SHAPE: Brand: KENDALL

## (undated) DEVICE — CATH SUC CTRL PRT TRIFLO 14FR --

## (undated) DEVICE — CATH IV SAFE STR 22GX1IN BLU -- PROTECTIV PLUS

## (undated) DEVICE — SYR 3ML LL TIP 1/10ML GRAD --

## (undated) DEVICE — MAJ-1414 SINGLE USE ADPATER BIOPSY VALV: Brand: SINGLE USE ADAPTOR BIOPSY VALVE

## (undated) DEVICE — SINGLE PORT MANIFOLD: Brand: NEPTUNE 2

## (undated) DEVICE — SYRINGE 50ML E/T

## (undated) DEVICE — BLUNTFILL: Brand: MONOJECT

## (undated) DEVICE — ERBE NESSY®PLATE 170 SPLIT; 168CM²; CABLE 3M: Brand: ERBE

## (undated) DEVICE — TRAP SPEC POLYP REM STRNR CLN DSGN MAGNIFYING WIND DISP